# Patient Record
Sex: FEMALE | URBAN - METROPOLITAN AREA
[De-identification: names, ages, dates, MRNs, and addresses within clinical notes are randomized per-mention and may not be internally consistent; named-entity substitution may affect disease eponyms.]

---

## 2020-10-08 ENCOUNTER — TELEPHONE ENCOUNTER (OUTPATIENT)
Dept: URBAN - METROPOLITAN AREA CLINIC 92 | Facility: CLINIC | Age: 30
End: 2020-10-08

## 2020-10-08 ENCOUNTER — OFFICE VISIT (OUTPATIENT)
Dept: URBAN - METROPOLITAN AREA TELEHEALTH 2 | Facility: TELEHEALTH | Age: 30
End: 2020-10-08
Payer: COMMERCIAL

## 2020-10-08 DIAGNOSIS — K76.0 FATTY LIVER: ICD-10-CM

## 2020-10-08 DIAGNOSIS — K21.9 CHRONIC GERD: ICD-10-CM

## 2020-10-08 DIAGNOSIS — R10.13 EPIGASTRIC ABDOMINAL PAIN: ICD-10-CM

## 2020-10-08 DIAGNOSIS — R14.2 BELCHING: ICD-10-CM

## 2020-10-08 PROCEDURE — G8482 FLU IMMUNIZE ORDER/ADMIN: HCPCS | Performed by: INTERNAL MEDICINE

## 2020-10-08 PROCEDURE — G8417 CALC BMI ABV UP PARAM F/U: HCPCS | Performed by: INTERNAL MEDICINE

## 2020-10-08 PROCEDURE — 1036F TOBACCO NON-USER: CPT | Performed by: INTERNAL MEDICINE

## 2020-10-08 PROCEDURE — 99204 OFFICE O/P NEW MOD 45 MIN: CPT | Performed by: INTERNAL MEDICINE

## 2020-10-08 PROCEDURE — G9903 PT SCRN TBCO ID AS NON USER: HCPCS | Performed by: INTERNAL MEDICINE

## 2020-10-08 PROCEDURE — G8427 DOCREV CUR MEDS BY ELIG CLIN: HCPCS | Performed by: INTERNAL MEDICINE

## 2020-10-08 NOTE — HPI-TODAY'S VISIT:
30F diagnosed with silent gerd last year by ENT c/o belching a lot. dry throat and hoarseness.runny nose--she was told this was from gerd  no heartburn, difficulty swallowing trying OTC PPI--did not help has h/o fatty liver--dad has SAINI cirrhosis. lost 43 lbs intentionally.

## 2020-10-21 ENCOUNTER — TELEPHONE ENCOUNTER (OUTPATIENT)
Dept: URBAN - METROPOLITAN AREA CLINIC 12 | Facility: CLINIC | Age: 30
End: 2020-10-21

## 2020-10-21 PROBLEM — 235595009 GASTROESOPHAGEAL REFLUX DISEASE: Status: ACTIVE | Noted: 2020-10-08

## 2020-10-21 LAB
ALBUMIN: 4.2
ALKALINE PHOSPHATASE: 75
ALT (SGPT): 21
AST (SGOT): 21
BILIRUBIN, DIRECT: 0.14
BILIRUBIN, TOTAL: 0.5
H PYLORI BREATH TEST: NEGATIVE
PROTEIN, TOTAL: 6.5

## 2020-11-05 ENCOUNTER — TELEPHONE ENCOUNTER (OUTPATIENT)
Dept: URBAN - METROPOLITAN AREA CLINIC 92 | Facility: CLINIC | Age: 30
End: 2020-11-05

## 2020-11-05 ENCOUNTER — OFFICE VISIT (OUTPATIENT)
Dept: URBAN - METROPOLITAN AREA LAB 3 | Facility: LAB | Age: 30
End: 2020-11-05
Payer: COMMERCIAL

## 2020-11-05 DIAGNOSIS — K29.30 CHRONIC SUPERFICIAL GASTRITIS: ICD-10-CM

## 2020-11-05 PROCEDURE — 43239 EGD BIOPSY SINGLE/MULTIPLE: CPT | Performed by: INTERNAL MEDICINE

## 2020-12-03 ENCOUNTER — OFFICE VISIT (OUTPATIENT)
Dept: URBAN - METROPOLITAN AREA TELEHEALTH 2 | Facility: TELEHEALTH | Age: 30
End: 2020-12-03
Payer: COMMERCIAL

## 2020-12-03 DIAGNOSIS — K76.0 FATTY LIVER: ICD-10-CM

## 2020-12-03 DIAGNOSIS — R14.2 BELCHING: ICD-10-CM

## 2020-12-03 PROBLEM — 197321007 FATTY LIVER: Status: ACTIVE | Noted: 2020-10-08

## 2020-12-03 PROCEDURE — G8482 FLU IMMUNIZE ORDER/ADMIN: HCPCS | Performed by: INTERNAL MEDICINE

## 2020-12-03 PROCEDURE — G8427 DOCREV CUR MEDS BY ELIG CLIN: HCPCS | Performed by: INTERNAL MEDICINE

## 2020-12-03 PROCEDURE — G9903 PT SCRN TBCO ID AS NON USER: HCPCS | Performed by: INTERNAL MEDICINE

## 2020-12-03 PROCEDURE — G8417 CALC BMI ABV UP PARAM F/U: HCPCS | Performed by: INTERNAL MEDICINE

## 2020-12-03 PROCEDURE — 99213 OFFICE O/P EST LOW 20 MIN: CPT | Performed by: INTERNAL MEDICINE

## 2020-12-03 PROCEDURE — 1036F TOBACCO NON-USER: CPT | Performed by: INTERNAL MEDICINE

## 2020-12-03 NOTE — HPI-TODAY'S VISIT:
30F diagnosed with silent gerd last year by ENT c/o belching a lot. dry throat and hoarseness.runny nose--she was told this was from gerd  no heartburn, difficulty swallowing trying OTC PPI--did not help has h/o fatty liver--dad has SAINI cirrhosis. lost 43 lbs intentionally.  . i performed egd EGD2020--mild gastritis LABS--normal LFTs, H pylori BT neg here for f/u  feels fine. no complaints. has not had any GERD symptoms in past few weeks still has belching on and off for which she take PPI prn

## 2021-03-09 ENCOUNTER — OFFICE VISIT (OUTPATIENT)
Dept: URBAN - METROPOLITAN AREA CLINIC 12 | Facility: CLINIC | Age: 31
End: 2021-03-09

## 2021-03-16 ENCOUNTER — OFFICE VISIT (OUTPATIENT)
Dept: URBAN - METROPOLITAN AREA CLINIC 23 | Facility: CLINIC | Age: 31
End: 2021-03-16

## 2021-03-22 ENCOUNTER — OFFICE VISIT (OUTPATIENT)
Dept: URBAN - METROPOLITAN AREA CLINIC 23 | Facility: CLINIC | Age: 31
End: 2021-03-22
Payer: COMMERCIAL

## 2021-03-22 DIAGNOSIS — K59.09 CHRONIC CONSTIPATION: ICD-10-CM

## 2021-03-22 PROCEDURE — G9903 PT SCRN TBCO ID AS NON USER: HCPCS | Performed by: INTERNAL MEDICINE

## 2021-03-22 PROCEDURE — G8417 CALC BMI ABV UP PARAM F/U: HCPCS | Performed by: INTERNAL MEDICINE

## 2021-03-22 PROCEDURE — G8427 DOCREV CUR MEDS BY ELIG CLIN: HCPCS | Performed by: INTERNAL MEDICINE

## 2021-03-22 PROCEDURE — G9622 NO UNHEAL ETOH USER: HCPCS | Performed by: INTERNAL MEDICINE

## 2021-03-22 PROCEDURE — 1036F TOBACCO NON-USER: CPT | Performed by: INTERNAL MEDICINE

## 2021-03-22 PROCEDURE — 3017F COLORECTAL CA SCREEN DOC REV: CPT | Performed by: INTERNAL MEDICINE

## 2021-03-22 PROCEDURE — 99214 OFFICE O/P EST MOD 30 MIN: CPT | Performed by: INTERNAL MEDICINE

## 2021-03-22 RX ORDER — ESCITALOPRAM OXALATE 5 MG/1
1 TABLET TABLET, FILM COATED ORAL ONCE A DAY
Status: ACTIVE | COMMUNITY

## 2021-03-22 NOTE — HPI-TODAY'S VISIT:
30-year-old female with history of multiple sclerosis presents for episodic constipation.  Pellet-like stools.  Digital disimpaction.   Currently she does not have constipation issue.  She drinks a lot of water and high-fiber diet.   She is also wondering if she has celiac disease.  Her genetic test for celiac trait was positive in the past.  No family history of celiac disease or inflammatory bowel disease.  Paternal uncle had colon cancer.

## 2021-03-22 NOTE — PHYSICAL EXAM HENT:
Head- normocephalic, atraumatic, Face- Face within normal limits, Ears- External ears within normal limits, Nose/Nasopharynx- External nose normal appearance, nares patent, no nasal discharge,  Mouth and Throat- Oral cavity appearance normal, Lips- Appearance normal

## 2021-03-22 NOTE — PHYSICAL EXAM GASTROINTESTINAL
Abdomen- soft, nontender, nondistended , no guarding or rigidity , no masses palpable , normal bowel sounds

## 2021-03-22 NOTE — PREVIOUS WORKUP REVIEWED
.ENDOSCOPIES-EGD 11/05/2020: normal esophagus. Minimal gastropathy in the body. Minimal duodenopathy in the 2nd portion of duodenum.*Pathology: duodenum-normal, no evidence of celiac sprue. Stomach-mild chronic gastritis, no Helicobacter pylori. Distal esophagus-normal. Proximal esophagus-normal. LABSIMAGES

## 2021-05-22 ENCOUNTER — DASHBOARD ENCOUNTERS (OUTPATIENT)
Age: 31
End: 2021-05-22

## 2021-06-01 ENCOUNTER — TELEPHONE ENCOUNTER (OUTPATIENT)
Dept: URBAN - METROPOLITAN AREA CLINIC 6 | Facility: CLINIC | Age: 31
End: 2021-06-01

## 2021-10-20 NOTE — PHYSICAL EXAM CHEST:
chest wall non-tender,breathing is unlabored without accessory muscle use,normal breath sounds
Detail Level: Zone